# Patient Record
Sex: MALE | Race: WHITE | Employment: FULL TIME | URBAN - METROPOLITAN AREA
[De-identification: names, ages, dates, MRNs, and addresses within clinical notes are randomized per-mention and may not be internally consistent; named-entity substitution may affect disease eponyms.]

---

## 2022-11-23 ENCOUNTER — APPOINTMENT (OUTPATIENT)
Dept: CT IMAGING | Age: 32
End: 2022-11-23
Payer: COMMERCIAL

## 2022-11-23 ENCOUNTER — APPOINTMENT (OUTPATIENT)
Dept: GENERAL RADIOLOGY | Age: 32
End: 2022-11-23
Payer: COMMERCIAL

## 2022-11-23 ENCOUNTER — HOSPITAL ENCOUNTER (EMERGENCY)
Age: 32
Discharge: HOME | End: 2022-11-23
Attending: EMERGENCY MEDICINE
Payer: COMMERCIAL

## 2022-11-23 VITALS
HEART RATE: 85 BPM | TEMPERATURE: 98.4 F | DIASTOLIC BLOOD PRESSURE: 84 MMHG | SYSTOLIC BLOOD PRESSURE: 126 MMHG | WEIGHT: 180 LBS | RESPIRATION RATE: 16 BRPM | HEIGHT: 67 IN | BODY MASS INDEX: 28.25 KG/M2 | OXYGEN SATURATION: 98 %

## 2022-11-23 DIAGNOSIS — S43.014A ANTERIOR DISLOCATION OF RIGHT SHOULDER, INITIAL ENCOUNTER: ICD-10-CM

## 2022-11-23 DIAGNOSIS — M21.829 HILL SACHS DEFORMITY: ICD-10-CM

## 2022-11-23 DIAGNOSIS — G40.919 BREAKTHROUGH SEIZURE (HCC): Primary | ICD-10-CM

## 2022-11-23 LAB
A/G RATIO: 1.5 (ref 1.1–2.2)
ALBUMIN SERPL-MCNC: 4.8 G/DL (ref 3.4–5)
ALP BLD-CCNC: 131 U/L (ref 40–129)
ALT SERPL-CCNC: 26 U/L (ref 10–40)
ANION GAP SERPL CALCULATED.3IONS-SCNC: 24 MMOL/L (ref 3–16)
AST SERPL-CCNC: 26 U/L (ref 15–37)
BASOPHILS ABSOLUTE: 0.1 K/UL (ref 0–0.2)
BASOPHILS RELATIVE PERCENT: 0.6 %
BILIRUB SERPL-MCNC: <0.2 MG/DL (ref 0–1)
BUN BLDV-MCNC: 13 MG/DL (ref 7–20)
CALCIUM SERPL-MCNC: 9.6 MG/DL (ref 8.3–10.6)
CHLORIDE BLD-SCNC: 104 MMOL/L (ref 99–110)
CO2: 12 MMOL/L (ref 21–32)
CREAT SERPL-MCNC: 1.1 MG/DL (ref 0.9–1.3)
EOSINOPHILS ABSOLUTE: 0.6 K/UL (ref 0–0.6)
EOSINOPHILS RELATIVE PERCENT: 5.4 %
GFR SERPL CREATININE-BSD FRML MDRD: >60 ML/MIN/{1.73_M2}
GLUCOSE BLD-MCNC: 120 MG/DL (ref 70–99)
HCT VFR BLD CALC: 44.5 % (ref 40.5–52.5)
HEMOGLOBIN: 14.8 G/DL (ref 13.5–17.5)
LYMPHOCYTES ABSOLUTE: 4.1 K/UL (ref 1–5.1)
LYMPHOCYTES RELATIVE PERCENT: 39.2 %
MCH RBC QN AUTO: 30.4 PG (ref 26–34)
MCHC RBC AUTO-ENTMCNC: 33.3 G/DL (ref 31–36)
MCV RBC AUTO: 91.1 FL (ref 80–100)
MONOCYTES ABSOLUTE: 0.6 K/UL (ref 0–1.3)
MONOCYTES RELATIVE PERCENT: 5.6 %
NEUTROPHILS ABSOLUTE: 5.1 K/UL (ref 1.7–7.7)
NEUTROPHILS RELATIVE PERCENT: 49.2 %
PDW BLD-RTO: 12.7 % (ref 12.4–15.4)
PLATELET # BLD: 380 K/UL (ref 135–450)
PMV BLD AUTO: 7.8 FL (ref 5–10.5)
POTASSIUM REFLEX MAGNESIUM: 4.5 MMOL/L (ref 3.5–5.1)
RBC # BLD: 4.88 M/UL (ref 4.2–5.9)
SODIUM BLD-SCNC: 140 MMOL/L (ref 136–145)
TOTAL PROTEIN: 8 G/DL (ref 6.4–8.2)
WBC # BLD: 10.3 K/UL (ref 4–11)

## 2022-11-23 PROCEDURE — 96365 THER/PROPH/DIAG IV INF INIT: CPT

## 2022-11-23 PROCEDURE — 96366 THER/PROPH/DIAG IV INF ADDON: CPT

## 2022-11-23 PROCEDURE — 23650 CLTX SHO DSLC W/MNPJ WO ANES: CPT

## 2022-11-23 PROCEDURE — 6370000000 HC RX 637 (ALT 250 FOR IP): Performed by: EMERGENCY MEDICINE

## 2022-11-23 PROCEDURE — 73030 X-RAY EXAM OF SHOULDER: CPT

## 2022-11-23 PROCEDURE — 2580000003 HC RX 258: Performed by: EMERGENCY MEDICINE

## 2022-11-23 PROCEDURE — 93005 ELECTROCARDIOGRAM TRACING: CPT | Performed by: EMERGENCY MEDICINE

## 2022-11-23 PROCEDURE — 96375 TX/PRO/DX INJ NEW DRUG ADDON: CPT

## 2022-11-23 PROCEDURE — 70450 CT HEAD/BRAIN W/O DYE: CPT

## 2022-11-23 PROCEDURE — 36415 COLL VENOUS BLD VENIPUNCTURE: CPT

## 2022-11-23 PROCEDURE — 71045 X-RAY EXAM CHEST 1 VIEW: CPT

## 2022-11-23 PROCEDURE — 99285 EMERGENCY DEPT VISIT HI MDM: CPT

## 2022-11-23 PROCEDURE — 85025 COMPLETE CBC W/AUTO DIFF WBC: CPT

## 2022-11-23 PROCEDURE — 80053 COMPREHEN METABOLIC PANEL: CPT

## 2022-11-23 PROCEDURE — 73200 CT UPPER EXTREMITY W/O DYE: CPT

## 2022-11-23 PROCEDURE — 6360000002 HC RX W HCPCS: Performed by: EMERGENCY MEDICINE

## 2022-11-23 RX ORDER — PROPOFOL 10 MG/ML
100 INJECTION, EMULSION INTRAVENOUS ONCE
Status: COMPLETED | OUTPATIENT
Start: 2022-11-23 | End: 2022-11-23

## 2022-11-23 RX ORDER — LORAZEPAM 1 MG/1
1 TABLET ORAL ONCE
Status: COMPLETED | OUTPATIENT
Start: 2022-11-23 | End: 2022-11-23

## 2022-11-23 RX ORDER — PROPOFOL 10 MG/ML
30 INJECTION, EMULSION INTRAVENOUS ONCE
Status: COMPLETED | OUTPATIENT
Start: 2022-11-23 | End: 2022-11-23

## 2022-11-23 RX ORDER — ONDANSETRON 2 MG/ML
4 INJECTION INTRAMUSCULAR; INTRAVENOUS
Status: DISCONTINUED | OUTPATIENT
Start: 2022-11-23 | End: 2022-11-23 | Stop reason: HOSPADM

## 2022-11-23 RX ORDER — 0.9 % SODIUM CHLORIDE 0.9 %
1000 INTRAVENOUS SOLUTION INTRAVENOUS ONCE
Status: COMPLETED | OUTPATIENT
Start: 2022-11-23 | End: 2022-11-23

## 2022-11-23 RX ORDER — CYCLOBENZAPRINE HCL 10 MG
10 TABLET ORAL 3 TIMES DAILY PRN
Qty: 20 TABLET | Refills: 0 | Status: SHIPPED | OUTPATIENT
Start: 2022-11-23 | End: 2022-12-03

## 2022-11-23 RX ORDER — FENTANYL CITRATE 50 UG/ML
25 INJECTION, SOLUTION INTRAMUSCULAR; INTRAVENOUS ONCE
Status: COMPLETED | OUTPATIENT
Start: 2022-11-23 | End: 2022-11-23

## 2022-11-23 RX ORDER — ORPHENADRINE CITRATE 30 MG/ML
60 INJECTION INTRAMUSCULAR; INTRAVENOUS ONCE
Status: COMPLETED | OUTPATIENT
Start: 2022-11-23 | End: 2022-11-23

## 2022-11-23 RX ORDER — LAMOTRIGINE 200 MG/1
TABLET ORAL
COMMUNITY
Start: 2022-11-11

## 2022-11-23 RX ORDER — TOPIRAMATE 50 MG/1
TABLET, FILM COATED ORAL
COMMUNITY
Start: 2022-09-02

## 2022-11-23 RX ORDER — HYDROCODONE BITARTRATE AND ACETAMINOPHEN 5; 325 MG/1; MG/1
1 TABLET ORAL EVERY 6 HOURS PRN
Qty: 16 TABLET | Refills: 0 | Status: SHIPPED | OUTPATIENT
Start: 2022-11-23 | End: 2022-11-27

## 2022-11-23 RX ORDER — LORAZEPAM 0.5 MG/1
0.5 TABLET ORAL 2 TIMES DAILY
Qty: 6 TABLET | Refills: 0 | Status: SHIPPED | OUTPATIENT
Start: 2022-11-23 | End: 2022-11-26

## 2022-11-23 RX ORDER — CETIRIZINE HYDROCHLORIDE 10 MG/1
10 TABLET ORAL DAILY PRN
COMMUNITY
Start: 2007-11-16

## 2022-11-23 RX ADMIN — SODIUM CHLORIDE 1000 ML: 9 INJECTION, SOLUTION INTRAVENOUS at 17:37

## 2022-11-23 RX ADMIN — SODIUM CHLORIDE 1000 ML: 9 INJECTION, SOLUTION INTRAVENOUS at 15:51

## 2022-11-23 RX ADMIN — LORAZEPAM 1 MG: 1 TABLET ORAL at 19:17

## 2022-11-23 RX ADMIN — PROPOFOL 100 MG: 10 INJECTION, EMULSION INTRAVENOUS at 17:39

## 2022-11-23 RX ADMIN — ONDANSETRON 4 MG: 2 INJECTION INTRAMUSCULAR; INTRAVENOUS at 15:46

## 2022-11-23 RX ADMIN — FENTANYL CITRATE 25 MCG: 0.05 INJECTION, SOLUTION INTRAMUSCULAR; INTRAVENOUS at 15:46

## 2022-11-23 RX ADMIN — PROPOFOL 30 MG: 10 INJECTION, EMULSION INTRAVENOUS at 17:41

## 2022-11-23 RX ADMIN — ORPHENADRINE CITRATE 60 MG: 30 INJECTION INTRAMUSCULAR; INTRAVENOUS at 15:45

## 2022-11-23 ASSESSMENT — PAIN - FUNCTIONAL ASSESSMENT: PAIN_FUNCTIONAL_ASSESSMENT: 0-10

## 2022-11-23 ASSESSMENT — PAIN SCALES - GENERAL: PAINLEVEL_OUTOF10: 7

## 2022-11-23 NOTE — ED PROVIDER NOTES
[facility] Emergency Department    Boris Paula MD, am the primary clinician of record. CHIEF COMPLAINT  Chief Complaint   Patient presents with    Seizures     Tonic Clonic seizure that lasted 1 minute at work. Pt Aox4 during triage. 's en route. C/o 7/10 pain in right shoulder, concern for dislocation. HISTORY OF PRESENT ILLNESS  Godfrey Craft is a 34 y.o. male  who presents to the ED complaining of history of seizures - diagnosed with epilepsy since 8th grade. He is on topamax and lamictal for AED control without any missed doses or changes in regimen recently. He reports he may have had a breakthrough seizure due to etoh use last night which is unusual for him (sometimes etoh can trigger seizure for him). No chest abd pains or headaches. No numbness tingling or weakness at all. He thinks he dislocated his right shoulder from this. No loss of b/b function or tongue biting from this. He reports the seizure was described by EMS as GTC lasting roughly 1 minute. He says he has ongoing seizures roughly every few months. He follows with Dr. Liset Dick at [facility]. He is R handed. Denies other injuries from this including head injury. Denies other drug use. No other complaints, modifying factors or associated symptoms. I have reviewed the following from the nursing documentation. Past Medical History:   Diagnosis Date    Seizures ([address]      History reviewed. No pertinent surgical history. History reviewed. No pertinent family history.   Social History     Socioeconomic History    Marital status:      Spouse name: Not on file    Number of children: Not on file    Years of education: Not on file    Highest education level: Not on file   Occupational History    Not on file   Tobacco Use    Smoking status: Not on file    Smokeless tobacco: Not on file   Substance and Sexual Activity    Alcohol use: Not on file    Drug use: Not on file    Sexual activity: Not on file   Other Topics Concern    Not on file   Social History Narrative    Not on file     Social Determinants of Health     Financial Resource Strain: Not on file   Food Insecurity: Not on file   Transportation Needs: Not on file   Physical Activity: Not on file   Stress: Not on file   Social Connections: Not on file   Intimate Partner Violence: Not on file   Housing Stability: Not on file     Current Facility-Administered Medications   Medication Dose Route Frequency Provider Last Rate Last Admin    ondansetron (ZOFRAN) injection 4 mg  4 mg IntraVENous Q1H PRN Augie Johnson MD   4 mg at 11/23/22 1546     Current Outpatient Medications   Medication Sig Dispense Refill    cetirizine (ZYRTEC) 10 MG tablet Take 10 mg by mouth daily as needed      lamoTRIgine (LAMICTAL) 200 MG tablet TAKE 1 TABLET (200 MG TOTAL) BY MOUTH EVERY MORNING AND 1.5 TABLETS (300 MG TOTAL) AT BEDTIME. LORazepam (ATIVAN) 0.5 MG tablet Take 1 tablet by mouth 2 times daily for 3 days. 6 tablet 0    HYDROcodone-acetaminophen (NORCO) 5-325 MG per tablet Take 1 tablet by mouth every 6 hours as needed for Pain (CAUTION: Can cause dizziness, don't drive while taking.) for up to 4 days. 16 tablet 0    cyclobenzaprine (FLEXERIL) 10 MG tablet Take 1 tablet by mouth 3 times daily as needed for Muscle spasms (CAUTION: Can cause dizziness, don't drive while taking.) 20 tablet 0    topiramate (TOPAMAX) 50 MG tablet TAKE 3 TABLETS (150 MG TOTAL) BY MOUTH 2 TIMES A DAY. INDICATIONS: TONIC-CLONIC EPILEPSY       No Known Allergies    REVIEW OF SYSTEMS  10 systems reviewed, pertinent positives per HPI otherwise noted to be negative. PHYSICAL EXAM  /84   Pulse 85   Temp 98.4 °F (36.9 °C) (Oral)   Resp 16   Ht 5' 7\" (1.702 m)   Wt 180 lb (81.6 kg)   SpO2 98%   BMI 28.19 kg/m²    GENERAL APPEARANCE: Awake and alert. Cooperative. No distress. HENT: Normocephalic. Atraumatic. Mucous membranes are dry. NECK: Supple.     EYES: PERRL. EOM's grossly intact. HEART/CHEST: Reg rhythm mild tachyardia. No murmurs. No chest wall tenderness. LUNGS: Respirations unlabored. CTAB. Good air exchange. Speaking comfortably in full sentences. ABDOMEN: No tenderness. Soft. Non-distended. No masses. No organomegaly. No guarding or rebound. Normal bowel sounds throughout. MUSCULOSKELETAL:  RUE: R shoulder with anterior dislocation suspected with no focal clavicular ttp or mid/distal humerus, forearm wrist or hand tenderness. 2+ radial pulse. Brisk cap refill x5 digits. Sensation and motor function fully intact in the radial, ulnar, and median nerve distribution. Full range of motion of all major joints except R shoulder as limited by injury. Cardinal movements of hand fully intact. No erythema, bruising, or lacerations. Comparments are soft. LUE:  No tenderness. 2+ radial pulse. Brisk cap refill x5 digits. Sensation and motor function fully intact in the radial, ulnar, and median nerve distribution. Full range of motion of all major joints. Cardinal movements of hand fully intact. No erythema, bruising, or lacerations. Comparments are soft. SKIN: Warm and dry. No acute rashes. NEUROLOGICAL: Alert and oriented. CN's 2-12 intact. No gross facial drooping. Strength 5/5, sensation intact. 2 plus DTR's in knees bilaterally. Gait normal.  PSYCHIATRIC: Normal mood and affect. LABS  I have reviewed all labs for this visit.    Results for orders placed or performed during the hospital encounter of 11/23/22   CBC with Auto Differential   Result Value Ref Range    WBC 10.3 4.0 - 11.0 K/uL    RBC 4.88 4.20 - 5.90 M/uL    Hemoglobin 14.8 13.5 - 17.5 g/dL    Hematocrit 44.5 40.5 - 52.5 %    MCV 91.1 80.0 - 100.0 fL    MCH 30.4 26.0 - 34.0 pg    MCHC 33.3 31.0 - 36.0 g/dL    RDW 12.7 12.4 - 15.4 %    Platelets 003 950 - 654 K/uL    MPV 7.8 5.0 - 10.5 fL    Neutrophils % 49.2 %    Lymphocytes % 39.2 %    Monocytes % 5.6 %    Eosinophils % 5.4 % Basophils % 0.6 %    Neutrophils Absolute 5.1 1.7 - 7.7 K/uL    Lymphocytes Absolute 4.1 1.0 - 5.1 K/uL    Monocytes Absolute 0.6 0.0 - 1.3 K/uL    Eosinophils Absolute 0.6 0.0 - 0.6 K/uL    Basophils Absolute 0.1 0.0 - 0.2 K/uL   Comprehensive Metabolic Panel w/ Reflex to MG   Result Value Ref Range    Sodium 140 136 - 145 mmol/L    Potassium reflex Magnesium 4.5 3.5 - 5.1 mmol/L    Chloride 104 99 - 110 mmol/L    CO2 12 (LL) 21 - 32 mmol/L    Anion Gap 24 (H) 3 - 16    Glucose 120 (H) 70 - 99 mg/dL    BUN 13 7 - 20 mg/dL    Creatinine 1.1 0.9 - 1.3 mg/dL    Est, Glom Filt Rate >60 >60    Calcium 9.6 8.3 - 10.6 mg/dL    Total Protein 8.0 6.4 - 8.2 g/dL    Albumin 4.8 3.4 - 5.0 g/dL    Albumin/Globulin Ratio 1.5 1.1 - 2.2    Total Bilirubin <0.2 0.0 - 1.0 mg/dL    Alkaline Phosphatase 131 (H) 40 - 129 U/L    ALT 26 10 - 40 U/L    AST 26 15 - 37 U/L   EKG 12 Lead   Result Value Ref Range    Ventricular Rate 95 BPM    Atrial Rate 95 BPM    P-R Interval 178 ms    QRS Duration 94 ms    Q-T Interval 368 ms    QTc Calculation (Bazett) 462 ms    P Axis 59 degrees    R Axis 18 degrees    T Axis 41 degrees    Diagnosis Normal sinus rhythmNormal ECG        The 12 lead EKG was interpreted by me independent of a cardiologist as follows:  Rate: normal with a rate of 95  Rhythm: sinus  Axis: normal  Intervals: normal DE, narrow QRS, normal QTc  ST segments: no ST elevations or depressions  T waves: no abnormal inversions  Non-specific T wave changes: not present  Prior EKG comparison: EKG dated 6/5/11 is not significantly different    RADIOLOGY    XR SHOULDER RIGHT (MIN 2 VIEWS)    Result Date: 11/23/2022  EXAMINATION: THREE XRAY VIEWS OF THE RIGHT SHOULDER 11/23/2022 5:37 pm COMPARISON: 11/23/2022 HISTORY: ORDERING SYSTEM PROVIDED HISTORY: reduction of disloc TECHNOLOGIST PROVIDED HISTORY: Reason for exam:->reduction of disloc Reason for Exam: reduction of disloc FINDINGS: There is partial reduction of the previous seen anterior inferior glenohumeral dislocation. There is still mild anterior inferior subluxation of the humeral head with respect to the glenoid. There is flattening the humeral head suggesting a Hill-Sachs impaction fracture. Visualized right lung is clear. Partial reduction of previous seen anterior inferior glenohumeral dislocation with persistent mild subluxation. Flattening of the superior humeral head suggesting Hill-Sachs impaction fracture. XR SHOULDER RIGHT (MIN 2 VIEWS)    Result Date: 11/23/2022  EXAMINATION: THREE XRAY VIEWS OF THE RIGHT SHOULDER 11/23/2022 4:10 pm COMPARISON: 06/05/2011 HISTORY: ORDERING SYSTEM PROVIDED HISTORY: seizure, dislocation suspected TECHNOLOGIST PROVIDED HISTORY: Reason for exam:->seizure, dislocation suspected Reason for Exam: seizure, dislocation suspected FINDINGS: The humeral head is dislocated anterior to the glenoid rim. There is some questionable bony irregularity along the glenoid rim inferiorly. The [facility] joint is intact. Anterior dislocation of the humeral head. Questionable bony irregularity along the inferior aspect of glenoid rim. Recommend follow-up. CT HEAD WO CONTRAST    Result Date: 11/23/2022  EXAMINATION: CT OF THE HEAD WITHOUT CONTRAST  11/23/2022 4:33 pm TECHNIQUE: CT of the head was performed without the administration of intravenous contrast. Automated exposure control, iterative reconstruction, and/or weight based adjustment of the mA/kV was utilized to reduce the radiation dose to as low as reasonably achievable. COMPARISON: 06/05/2011 HISTORY: ORDERING SYSTEM PROVIDED HISTORY: seizure TECHNOLOGIST PROVIDED HISTORY: Reason for exam:->seizure Has a \"code stroke\" or \"stroke alert\" been called? ->No Decision Support Exception - unselect if not a suspected or confirmed emergency medical condition->Emergency Medical Condition (MA) Reason for Exam: Seizures (Tonic Clonic seizure that lasted 1 minute at work. Pt Aox4 during triage.  's en route. C/o 7/10 pain in right shoulder, concern for dislocation. ) FINDINGS: BRAIN/VENTRICLES: There is no acute intracranial hemorrhage, mass effect or midline shift. No abnormal extra-axial fluid collection. The gray-white differentiation is maintained without evidence of an acute infarct. There is no evidence of hydrocephalus. ORBITS: The visualized portion of the orbits demonstrate no acute abnormality. SINUSES: The visualized paranasal sinuses and mastoid air cells demonstrate no acute abnormality. SOFT TISSUES/SKULL:  No acute abnormality of the visualized skull or soft tissues. No acute intracranial abnormality. CT SHOULDER RIGHT WO CONTRAST    Result Date: 11/23/2022  EXAMINATION: CT OF THE RIGHT SHOULDER WITHOUT CONTRAST 11/23/2022 6:41 pm TECHNIQUE: CT of the right shoulder was performed without the administration of intravenous contrast.  Multiplanar reformatted images are provided for review. Automated exposure control, iterative reconstruction, and/or weight based adjustment of the mA/kV was utilized to reduce the radiation dose to as low as reasonably achievable. COMPARISON: Right shoulder radiographs November 23, 2022 HISTORY ORDERING SYSTEM PROVIDED HISTORY: suspect complete reduction of R shoulder, eval for hill sachs TECHNOLOGIST PROVIDED HISTORY: Reason for exam:->suspect complete reduction of R shoulder, eval for hill sachs Decision Support Exception - unselect if not a suspected or confirmed emergency medical condition->Emergency Medical Condition (MA) Reason for Exam: Suspect complete reduction of R shoulder, eval for hill sachs. Seizures (Tonic Clonic seizure that lasted 1 minute at work. Pt Aox4 during triage. 's en route. C/o 7/10 pain in right shoulder, concern for dislocation. ). FINDINGS: Bones: Cortical irregularity and well corticated ossicles along the anterior inferior left glenoid compatible with chronic osseous Bankart injury.  Flattening and irregularity of the posterolateral humeral head consistent with Hill-Sachs deformity. Soft Tissue: Imaged left lung field demonstrates mild atelectasis but otherwise appears clear. Subcutaneous tissues appear grossly unremarkable. Joint: Anatomic alignment of the glenohumeral joint status post reduction. Anatomic alignment of the acromioclavicular joint. Mild glenohumeral osteoarthritis. 1. Hill-Sachs deformity of the humeral head. 2. 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 3. Anatomic alignment of the right glenohumeral joint status post reduction. 4. Mild glenohumeral osteoarthritis. XR CHEST PORTABLE    Result Date: 11/23/2022  EXAMINATION: ONE XRAY VIEW OF THE CHEST 11/23/2022 4:10 pm COMPARISON: None. HISTORY: ORDERING SYSTEM PROVIDED HISTORY: seizure TECHNOLOGIST PROVIDED HISTORY: Reason for exam:->seizure Reason for Exam: seizure FINDINGS: Clear lungs. No pleural effusion or pneumothorax. Cardiomediastinal silhouette is unremarkable. Visualized osseous structures are unremarkable. Clear lungs. ED COURSE/MDM  Patient seen and evaluated. Old records reviewed. Labs and imaging reviewed and results discussed with patient. The patient's ED workup was notable for breakthrough seizure, likely attributable to his alcohol use last night although is well followed by neurology at [facility] and compliant with his antiepileptic medications. His seizure has caused a right anterior shoulder dislocation. The patient remained neurovascularly intact pre and post reduction. He does have some acidemia noted however this is likely related to his seizure, his heart rate and symptoms improved on reassessment and he does not have any also suggest severe infection or dehydration. The conscious sedation was performed by me, see procedure note below.   During this I was at the bedside for continuous supervision of the shoulder dislocation reduction which was performed by the nurse practitioner, see her note for details of this procedure. Repeat XR showed questionable subluxation however patient states it feels like it is in the joint and this may be related to his repetitive dislocations in the past.  HCT negative. Dr. Nicole Duke from ortho was consulted about the patient's ED history, physical, workup, and course so far. Recommendations from this consultant included get CT to evaluate joint and this demonstrates normal alignment with Hill-Sachs and Bankart deformities. We will follow-up with orthopedics as an outpatient in the next week or so. Remain in a sling in the interim. I will also give him a 0.5 mg twice daily x3-day prescription of Ativan which is a typical suppressive dose for him when he has a breakthrough seizure and did encourage him to follow-up with his neurologist.      Is this patient to be included in the SEP-1 Core Measure? No   Exclusion criteria - the patient is NOT to be included for SEP-1 Core Measure due to: Infection is not suspected    Conscious Sedation Procedure Note    Indication: shoulder dislocation    Consent: I have discussed with the patient and/or the patient representative the indication, alternatives, and the possible risks and/or complications of the planned procedure and the anesthesia methods. The patient and/or patient representative appear to understand and agree to proceed. Physician Involvement: The attending physician was present and supervising this procedure. Pre-Sedation Documentation and Exam: I have personally completed a history, physical exam & review of systems for this patient (see notes). Vital signs have been reviewed (see flow sheet for vitals). Lungs: clear to auscultation bilaterally, Cardiovascular: regular rate and rhythm.   Airway Assessment: normal, dentition not prohibitive, normal neck range of motion, Mallampati Class I - (soft palate, fauces, uvula & anterior/posterior tonsillar pillars are visible)    Prior History of Anesthesia Complications: none    ASA Classification: Class 1 - A normal healthy patient    Sedation/ Anesthesia Plan: intravenous sedation    Medications Used: propofol intravenously  - total of 130mg    Monitoring and Safety: The patient was placed on a cardiac monitor and vital signs, pulse oximetry and level of consciousness were continuously evaluated throughout the procedure. The patient was closely monitored until recovery from the medications was complete and the patient had returned to baseline status. Respiratory therapy was on standby at all times during the procedure. (The following sections must be completed)  Post-Sedation Vital Signs: Vital signs were reviewed and were stable after the procedure (see flow sheet for vitals)            Post-Sedation Exam: Lungs: clear to auscultation bilaterally and Cardiovascular: regular rate and rhythm           Complications: none        During the patient's ED course, the patient was given:  Medications   ondansetron (ZOFRAN) injection 4 mg (4 mg IntraVENous Given 11/23/22 1546)   0.9 % sodium chloride IV bolus 1,000 mL (0 mLs IntraVENous Stopped 11/23/22 1641)   orphenadrine (NORFLEX) injection 60 mg (60 mg IntraVENous Given 11/23/22 1545)   fentaNYL (SUBLIMAZE) injection 25 mcg (25 mcg IntraVENous Given 11/23/22 1546)   0.9 % sodium chloride IV bolus 1,000 mL (0 mLs IntraVENous Stopped 11/23/22 1834)   propofol injection 100 mg (100 mg IntraVENous Given 11/23/22 1739)   propofol injection 30 mg (0 mg IntraVENous Stopped 11/23/22 1741)   LORazepam (ATIVAN) tablet 1 mg (1 mg Oral Given 11/23/22 1917)        CLINICAL IMPRESSION  1. Breakthrough seizure ([facility] [address]    2. Anterior dislocation of right shoulder, initial encounter    3. Hill Sachs deformity        Blood pressure 126/84, pulse 85, temperature 98.4 °F (36.9 °C), temperature source Oral, resp. rate 16, height 5' 7\" (1.702 m), weight 180 lb (81.6 kg), SpO2 98 %.     DISPOSITION  Primitivo Bautista was discharged to home in stable condition. I have discussed the findings of today's workup with the patient and addressed the patient's questions and concerns. Important warning signs as well as new or worsening symptoms which would necessitate immediate return to the ED were discussed. The plan is to discharge from the ED at this time, and the patient is in stable condition. The patient acknowledged understanding is agreeable with this plan. Patient was given scripts for the following medications. I counseled patient how to take these medications. New Prescriptions    CYCLOBENZAPRINE (FLEXERIL) 10 MG TABLET    Take 1 tablet by mouth 3 times daily as needed for Muscle spasms (CAUTION: Can cause dizziness, don't drive while taking.)    HYDROCODONE-ACETAMINOPHEN (NORCO) 5-325 MG PER TABLET    Take 1 tablet by mouth every 6 hours as needed for Pain (CAUTION: Can cause dizziness, don't drive while taking.) for up to 4 days. LORAZEPAM (ATIVAN) 0.5 MG TABLET    Take 1 tablet by mouth 2 times daily for 3 days. Follow-up with:  Gia GILLIS [address]  [address] [facility]  [address] [facility].  [phone]    Schedule an appointment as soon as possible for a visit in 1 week  For symptom re-evaluation    Sheridan Trevino MD  [address]  [phone]    Schedule an appointment as soon as possible for a visit in 2 days  For symptom re-evaluation    [facility] Emergency Department  [facility] [address] [facility]  Go to   If symptoms worsen    DISCLAIMER: This chart was created using Dragon dictation software. Efforts were made by me to ensure accuracy, however some errors may be present due to limitations of this technology and occasionally words are not transcribed correctly.         Zi Toth MD  11/23/22 9193

## 2022-11-23 NOTE — SEDATION DOCUMENTATION
MD and PA at bedside as well as pharmacist and RT, procedure started  100 mg of propofol given by   30 mg more of propofol given by

## 2022-11-23 NOTE — PROGRESS NOTES
Pharmacy Home Medication Reconciliation Note    A medication reconciliation has been completed for Connie Bruce 1990    Pharmacy: [place] [id number] [address] provided by: Patient, Laina    The patient's home medication list is as follows:  Prior to Admission medications    Medication Sig Start Date End Date Taking? Authorizing Provider   cetirizine (ZYRTEC) 10 MG tablet Take 10 mg by mouth daily as needed 11/16/07  Yes Historical Provider, MD   lamoTRIgine (LAMICTAL) 200 MG tablet TAKE 1 TABLET (200 MG TOTAL) BY MOUTH EVERY MORNING AND 1.5 TABLETS (300 MG TOTAL) AT BEDTIME. 11/11/22  Yes Historical Provider, MD   topiramate (TOPAMAX) 50 MG tablet TAKE 3 TABLETS (150 MG TOTAL) BY MOUTH 2 TIMES A DAY. INDICATIONS: TONIC-CLONIC EPILEPSY 9/2/22   Historical Provider, MD   topiramate (TOPAMAX) 25 MG capsule Take 75 mg by mouth 2 times daily. 11/23/22  Historical Provider, MD   lamotrigine (LAMICTAL) 100 MG tablet Take 100 mg by mouth 2 times daily. 11/23/22  Historical Provider, MD   Multiple Vitamin (MULTIVITAMIN PO) Take 1 tablet by mouth daily. 11/23/22  Historical Provider, MD      Patient is no longer taking a multivitamin. Of note, seizure medication dosages have been updated. Timing of last doses updated.     Thank you,  Brandt Luciano, PharmD  PGY-1 Pharmacy Resident  [id number]

## 2022-11-23 NOTE — ED PROVIDER NOTES
PROCEDURE NOTE    I was asked by the attending physician, No att. providers found, to evaluate this patient for shoulder reduction    Ortho Injury    Date/Time: 11/23/2022 5:42 PM  Performed by: YESSICA Cazares CNP  Authorized by: Zenia Wan MD   Consent: Verbal consent obtained. Risks and benefits: risks, benefits and alternatives were discussed  Consent given by: patient  Patient understanding: patient states understanding of the procedure being performed  Patient consent: the patient's understanding of the procedure matches consent given  Procedure consent: procedure consent matches procedure scheduled  Relevant documents: relevant documents present and verified  Test results: test results available and properly labeled  Site marked: the operative site was marked  Imaging studies: imaging studies available  Required items: required blood products, implants, devices, and special equipment available  Patient identity confirmed: verbally with patient and arm band  Time out: Immediately prior to procedure a \"time out\" was called to verify the correct patient, procedure, equipment, support staff and site/side marked as required. Injury location: shoulder  Location details: right shoulder  Injury type: dislocation  Dislocation type: anterior  Hill-Sachs deformity: no  Chronicity: new  Pre-procedure neurovascular assessment: neurovascularly intact  Pre-procedure distal perfusion: normal  Pre-procedure neurological function: normal  Pre-procedure range of motion: normal    Sedation:  Patient sedated: see anesthesia note.     Immobilization: sling  Post-procedure neurovascular assessment: post-procedure neurovascularly intact  Post-procedure distal perfusion: normal  Post-procedure neurological function: normal  Post-procedure range of motion: normal  Patient tolerance: patient tolerated the procedure well with no immediate complications            YESSICA Cazares CNP  11/24/22 0002

## 2022-11-24 LAB
EKG ATRIAL RATE: 95 BPM
EKG DIAGNOSIS: NORMAL
EKG P AXIS: 59 DEGREES
EKG P-R INTERVAL: 178 MS
EKG Q-T INTERVAL: 368 MS
EKG QRS DURATION: 94 MS
EKG QTC CALCULATION (BAZETT): 462 MS
EKG R AXIS: 18 DEGREES
EKG T AXIS: 41 DEGREES
EKG VENTRICULAR RATE: 95 BPM

## 2022-11-24 PROCEDURE — 93010 ELECTROCARDIOGRAM REPORT: CPT | Performed by: INTERNAL MEDICINE

## 2022-11-24 NOTE — ED NOTES
AVS reviewed with patient and family. Pt demonstrates understand of d/c orders.      Alaina Yao RN  11/23/22 1940